# Patient Record
(demographics unavailable — no encounter records)

---

## 2017-01-20 NOTE — HPE
DATE OF ADMISSION:  01/20/2017

 

PRIMARY CARE PROVIDER:  The patient used to go to [facility].  However, his

primary care provider left and currently does not see a primary care doctor.

 

REASON FOR ADMISSION:  Shortness of breath.

 

HISTORY OF PRESENT ILLNESS:  The patient is a 50-year-old male with no known

medical history, presented to the emergency room complaining of chest pain since

around 5 o'clock last evening.  He stated the pain started in the right side of

his chest and radiated to the left side.  Denies any other symptoms other than

shortness of breath associated with the pain, states that it gets worse with deep

inspiration, described the pain as sharp in nature and constant, reproducible,

denies any associated diaphoresis, dizziness or nausea with it, states he has

never had pain like this before.  He presented to the emergency room because of a

family history of cardiac disease.  Prior to arrival to the emergency room the

patient was given nitroglycerin and aspirin in the ambulance.  In the emergency

room the patient was found to be slightly hypoxic and was started on oxygen.

Blood pressure was 120s over 87, pulse in the 80s, respiratory rate 18,

temperature 97.7.  First set of troponins were negative.  EKG showed no ST

changes.  D-dimer was ordered.  It was found to be elevated.  The patient had an

IV dye allergy, so VQ scan was ordered which showed low intermediate probability

of pulmonary embolus (PE).  The patient was admitted under hospitalist service.

 

REVIEW OF SYSTEMS:  12-point review of systems was obtained all of which was

negative except for those mentioned above.

 

PAST MEDICAL HISTORY:  None.

 

PAST SURGICAL HISTORY:  The patient has history of back surgery and hernia

repair.

 

HOME MEDICATION:  None.

 

ALLERGIES:  To PENICILLIN, reaction severe, and IV DYE, unknown reaction.

 

SOCIAL HISTORY:  The patient smokes one pack a day for the past 30 years.  Drinks

daily 6-10 beers.  Lives at home with his wife and stepson.

 

FAMILY HISTORY:  The patient's father had a heart attack at age 42 and he has a

cousin that had a heart attack at age 38.  Mother has history of seizures.

 

PHYSICAL EXAMINATION:

Blood pressure 126/87, pulse 80, respiratory rate 18, temperature 97.7, pulse

oximetry 92% on room air.

HEENT:  Pupils equal, round, reactive to light and accommodation.

NECK:  Supple.  No jugular venous distention (JVD).

LUNGS:  Diminished breath sounds in all lung fields.

CARDIAC: +3/6 systolic murmur.  Regular rate and rhythm.

ABDOMEN:  Soft, nontender, nondistended.

EXTREMITIES:  No clubbing, cyanosis or edema.

SKIN:  No new lesions or rashes.

NEUROLOGICAL:  Cranial nerves II-XII grossly intact.  No focal deficits.

 

LABORATORY DATA:

Sodium 138, potassium 3.6, chloride 101, BUN 3, creatinine 0.52, fasting glucose

72, calcium 7.9, troponin negative times two.  WBC 6.4, hemoglobin 12.7,

hematocrit 37.9, platelet count 56, D-dimer 2344.

 

Chest x-ray showed trace left basilar atelectasis.

 

Lung VQ scan showed low indeterminate probability of PE, extensive upper lobe

matched defect contributed by the air trapping.

 

Ultrasound showed no evidence of deep venous thrombosis (DVT) bilateral lower

extremities.

 

ASSESSMENT/PLAN:

1.  Chest pain, reproducible, likely musculoskeletal in nature.  However given

the patient's family history we will admit the patient for cardiac rule out.  We

will order troponins every 6 hours times three.  Repeat EKG in the morning.  We

will monitor the patient on telemetry.  We will order a CT of the chest without

contrast.

 

2.  Shortness of breath.  May be secondary to undiagnosed chronic obstructive

pulmonary disease (COPD).  The patient is a heavy smoker, has been smoking one

pack per day for the past 30 years, has diminished breath sounds in all lung

fields.  We will start him on oxygen, titrate to keep oxygen saturation between

88 and 92.

 

3.  Cardiac systolic murmur.  Per patient, he has no history of murmurs.  Will

order an echocardiogram.

 

4.  Thrombocytopenia, unknown etiology at this time.

 

5.  History of heavy alcohol use.  We will start the patient on Serax, thiamine,

folate, multivitamin.

 

6.  Deep venous thrombosis (DVT) prophylaxis.  Sequential compression devices

(SCDs) while in bed.

## 2017-01-20 NOTE — EDDOCDS
Nurse's Notes                                                                                     

[facility]                                                                         

Name: Gene Angel Ii                                                                               

Age: 50 yrs                                                                                       

Sex: Male                                                                                         

: 1966                                                                                   

MRN: [id number]                                                                                     

Arrival Date: 2017                                                                          

Time: 05:49                                                                                       

Account#: [id number]                                                                              

Bed Admit Hold                                                                                    

Private MD: Jaleesa Pcp                                                                                

Diagnosis: Chest pain, unspecified                                                                

                                                                                                  

Presentation:                                                                                     

                                                                                             

05:55 Presenting complaint: EMS states: patient complaining of chest pain since 1700          kas2

      yesterday. Right sided chest pain radiating into left side. States he cannot get a deep     

      breath. Denies nausea. Aspirin was taken PTA. Adult Sepsis Screening: The patient does      

      not have new or worsening altered mentation. Patient's respiratory rate is less than        

      22. Systolic blood pressure is greater than 100. Patient has a qSOFA score of 0-            

      Negative Sepsis Screen. Suicide/Homicide risk assessment- the patient denies having any     

      suicidal and/or homicidal ideations and does not present with any other emotional,          

      behavioral or mental health complaints.  Status: Patient is not a service           

      member or  dependent. Transition of care: patient was not received from another     

      setting of care.                                                                            

05:55 Acuity: OWEN Level 3                                                                     [facility]

05:55 Method Of Arrival: Ambulance                                                            [facility]

                                                                                                  

Triage Assessment:                                                                                

06:00 General: Appears in no apparent distress, uncomfortable, well nourished, Behavior is    kas2

      appropriate for age, cooperative. Pain: Location: anterior aspect of left upper chest       

      Pain currently is 8 out of 10 on a pain scale. Pain does not radiate. Quality of pain       

      is described as stabbing, Pain began 2-3 days ago Is continuous. Pt Declines HIV            

      testing. Neurological: Level of Consciousness is awake, alert, Oriented to person,          

      place, time. Cardiovascular: Capillary refill < 3 seconds Heart tones S1 S2 present         

      Chest pain is described as severe, quality is stabbing, is located in left chest wall       

      radiates Does not radiate. episodes are continuous began 3 days ago. Respiratory:           

      Airway is patent Respiratory effort is even, unlabored, Respiratory pattern is regular,     

      symmetrical. Derm: Skin is intact, Skin is dry, Skin is pink, warm & dry. Skin            

      temperature is warm.                                                                        

                                                                                                  

Historical:                                                                                       

- Allergies: Penicillins; IV Dye;                                                                 

- Home Meds:                                                                                      

1. none                                                                                         

- PMHx: back surgery;                                                                             

- Social history: Smoking status: Patient uses tobacco products, heavy tobacco smoker.            

No barriers to communication noted, The patient speaks fluent English.                          

- : The pt / caregiver states he / she is not on anticoagulants. Note patient not on              

meds.                                                                                           

- Exposure Risk Screening:: None identified.                                                      

                                                                                                  

                                                                                                  

Screenin:21 Screening information is obtained from the patient. Fall risk: No risks identified.     tm5 

      Assistance ADL's: requires no assistance with activities of daily living. Abuse/DV          

      Screen: The patient / caregiver reports he/she is: not in a situation that causes fear,     

      pain or injury. Nutritional screening: No deficits noted. Advance Directives:               

      Currently, there is no health care proxy. There is no active DNR order. home support is     

      adequate.                                                                                   

                                                                                                  

Assessment:                                                                                       

06:04 General: See triage note..                                                              kas2

06:21 Cardiovascular: Rhythm is sinus rhythm No ectopy.                                       tm5 

07:17 General: Appears in no apparent distress, Behavior is appropriate for age, cooperative. hs1 

      Pain: Location: left breast Pain currently is 6 out of 10 on a pain scale.                  

      Neurological: No deficits noted. Cardiovascular: Rhythm is sinus rhythm No ectopy.          

      Respiratory: Airway is patent Respiratory effort is even, unlabored, Respiratory            

      pattern is regular, symmetrical, Breath sounds are coarse bilaterally. GI: Denies           

      vomiting. Derm: Skin is pink, warm & dry. normal.                                         

08:31 General: Patient in radiology wing having radiographic studies at this time. .          hs1 

09:41 General: Appears in no apparent distress, comfortable, to be sleeping. Patient resting  hs1 

      with eyes closed. Patients lights dimmed and door closed to minimize noise for patient      

      comfort. .                                                                                  

10:38 General: Appears in no apparent distress, comfortable, Behavior is appropriate for age, hs1 

      cooperative. Pain: Denies pain. Neurological: No deficits noted. Cardiovascular: Rhythm     

      is sinus rhythm No ectopy. Respiratory: No deficits noted. GI: No deficits noted. Derm:     

      Skin is pink, warm & dry. normal.                                                         

11:32 General: Appears in no apparent distress, comfortable, Behavior is appropriate for age, hs1 

      cooperative. Pain: Denies pain. Cardiovascular: Rhythm is sinus rhythm No ectopy.           

      Respiratory: No deficits noted. Airway is patent Respiratory effort is even, unlabored,     

      Respiratory pattern is regular, symmetrical, Breath sounds are coarse bilaterally.          

      Derm: Skin is pink, warm & dry. normal.                                                   

12:24 General: Appears in no apparent distress, comfortable, Behavior is appropriate for age, hs1 

      cooperative. Pain: Location: chest Pain currently is 2 out of 10 on a pain scale.           

      Neurological: No deficits noted. Cardiovascular: Rhythm is sinus rhythm No ectopy.          

      Respiratory: No deficits noted. GI: No deficits noted. Derm: Skin is pink, warm & dry.    

      normal.                                                                                     

13:37 General: Patient having active chest pains 10/10 patient states tears and crushing      hs1 

      chest pain. Mary NELSON Called and she states orders placed in computer. Patient aware. .      

      Cardiovascular: Rhythm is sinus rhythm No ectopy. Respiratory: No deficits noted. GI:       

      No deficits noted.                                                                          

14:32 General: Appears in no apparent distress, comfortable, Behavior is appropriate for age, hs1 

      cooperative, Patient states mild heartburn at this time. Patient resting on stretcher       

      and transferred to hospital bed in care of alexis MADELYN Allred at this time. .         

                                                                                                  

Vital Signs:                                                                                      

06:13  / 85 (auto/);                                                                    tm5 

06:15 Pulse 84 MON; Pulse Ox 93% on R/A;                                                      tm5 

06:18  / 85; Pulse 82; Resp 18; Temp 97.7(O); Pulse Ox 92% on R/A; Weight 81.65 kg (R); jmv 

      Height 5 ft. 7 in. (170.18 cm); Pain 8/10;                                                  

06:36  / 87 (auto/);                                                                    tm5 

06:37 Pulse 80 MON; Pulse Ox 92% on R/A;                                                      tm5 

07:51  / 73 (auto/);                                                                    hs1 

07:52 Pulse 84 MON; Resp 18; Pulse Ox 94% ;                                                   hs1 

08:06  / 80 (auto/);                                                                    hs1 

08:06 Pulse 86 MON; Pulse Ox 95% ;                                                            hs1 

08:21  / 77 (auto/);                                                                    hs1 

08:21 Pulse 86 MON; Pulse Ox 93% ;                                                            hs1 

08:36  / 81 (auto/);                                                                    hs1 

08:36 Pulse 88 MON; Pulse Ox 84% ;                                                            hs1 

08:51  / 70 (auto/);                                                                    hs1 

08:51 Pulse 80 MON; Pulse Ox 92% ;                                                            hs1 

09:06  / 78 (auto/);                                                                    hs1 

09:06 Pulse 86 MON; Pulse Ox 91% ;                                                            hs1 

09:21  / 65 (auto/);                                                                    hs1 

09:21 Pulse 88 MON; Pulse Ox 91% ;                                                            hs1 

09:36  / 67 (auto/);                                                                    hs1 

09:36 Pulse 90 MON; Resp 18; Pulse Ox 90% ;                                                   hs1 

09:51  / 68 (auto/);                                                                    hs1 

09:51 Pulse 90 MON; Pulse Ox 90% ;                                                            hs1 

10:06  / 71 (auto/);                                                                    hs1 

10:06 Pulse 92 MON; Pulse Ox 90% ;                                                            hs1 

10:21  / 71 (auto/);                                                                    hs1 

10:22 Pulse 86 MON; Pulse Ox 92% ;                                                            hs1 

10:36 BP 96 / 66 (auto/);                                                                     hs1 

10:36 Pulse 84 MON; Pulse Ox 91% ;                                                            hs1 

10:41 Pulse 82 MON; Pulse Ox 90% ;                                                            hs1 

11:26  / 86 (auto/);                                                                    hs1 

11:41  / 81 (auto/);                                                                    hs1 

11:41 Pulse 82 MON; Pulse Ox 93% ;                                                            hs1 

11:56  / 84 (auto/);                                                                    hs1 

11:56 Pulse 76 MON; Resp 18; Pulse Ox 94% ;                                                   hs1 

12:09 Pulse 78 MON; Pulse Ox 93% ;                                                            hs1 

12:11  / 79 (auto/);                                                                    hs1 

12:25 Pulse 80 MON; Pulse Ox 93% ;                                                            hs1 

12:26  / 87 (auto/);                                                                    hs1 

12:41  / 87 (auto/);                                                                    hs1 

12:41 Pulse 84 MON; Pulse Ox 94% ;                                                            hs1 

12:56  / 86 (auto/);                                                                    hs1 

12:57 Pulse 116 MON; Pulse Ox 92% ;                                                           hs1 

13:10 Pulse 126 MON;                                                                          hs1 

13:11 Pulse 104 MON; Pulse Ox 91% ;                                                           hs1 

13:11  / 91 (auto/);                                                                    hs1 

13:12 Pulse 92 MON;                                                                           hs1 

13:28  / 81 (auto/);                                                                    hs1 

13:29 Pulse 100 MON; Resp 18; Pulse Ox 93% ; Pain 10/10;                                      hs1 

13:41  / 78 (auto/);                                                                    hs1 

13:42 Pulse 86 MON; Pulse Ox 90% ;                                                            hs1 

13:56  / 74 (auto/);                                                                    hs1 

13:57 Pulse 86 MON; Pulse Ox 89% ;                                                            hs1 

14:11  / 72 (auto/);                                                                    hs1 

14:12 Pulse 84 MON; Resp 18; Pulse Ox 91% ;                                                   hs1 

06:18 Body Mass Index 28.19 (81.65 kg, 170.18 cm)                                             [facility] 

                                                                                                  

Vitals:                                                                                           

06:00 Log In Time N/A - ambulance arrival.                                                    [facility]

                                                                                                  

ED Course:                                                                                        

05:50 Patient visited by Unique Roberts PCA.                                                tmm1

05:50 Patient moved to Waiting                                                                tmm1

05:51 No Pcp is Private Physician.                                                            tmm1

05:53 Eladia Rahman,RN is Primary Nurse.                                                 tmm1

05:53 Patient moved to 11                                                                     tmm1

05:57 Triage Initiated                                                                        kas2

06:04 Maintain field IV. Dressing intact. Site clean & dry. Gauge & site: 18G right forearm.  kas2

06:05 Patient visited by Ashley Jorgensen RN.                                                        [facility]

06:20 Pt greeted and oriented to ED. Patient advised of names of staff involved in care,      [facility] 

      location of call bell, wait times and NPO status. Accompanied by Significant Other,         

      Patient has correct armband on for positive identification. Placed in gown. Bed in low      

      position. Call light in reach. Side rails up X2. Cardiac monitor on. Pulse ox on. NIBP      

      on.                                                                                         

06:20 Basic Metabolic Profile Sent.                                                           tm5 

06:20 CBC with Diff Sent.                                                                     tm5 

06:20 Cardiac Injury Profile Sent.                                                            tm5 

06:20 D-Dimer Quant Sent.                                                                     tm5 

06:21 Patient visited by Ankur Snyder PCA.                                                     [facility] 

06:21 Troponin Sent.                                                                          tm5 

06:21 Labs drawn. (by ED staff). Sent per order to lab.                                       tm5 

06:21 EKG done. (by ED staff). Reviewed by Sree Bowden DO.                                v 

06:47 Patient visited by Milana Mcfadden RN.                                                     tm5 

06:48 Notified attending ED physician of Critical lab value. Dr Bowden aware of D-dimer      5 

      results.                                                                                    

07:07 Lorraine Snatos DO is PHCP.                                                           jo4 

07:07 Lobito Alba MD is Attending Physician.                                               jo4 

07:17 Patient visited by Lois Hilton RN.                                                hs1 

07:23 Patient visited by Lorraine Santos DO.                                                jo4 

07:32 Chest, 2 View (pa\E\lat) Returned.                                                        EDMS

08:05 Patient moved to Ultrasound                                                             eg2 

08:08 Patient visited by Lobito Alba MD.                                                   br1 

08:22 Patient name changed from Gene\S\L\S\Raymo\S\ to Gene\S\L\S\Raymo Ii.                          
   EDMS

08:23 [facility] Payment Agreement was scanned into [employer] and attached to record.               lg  

08:42 Patient moved to 11                                                                     eg2 

08:43 Patient visited by Lois Hilton RN.                                                hs1 

09:00 US Lower Extremities Bilateral R/O DVT Returned.                                        EDMS

09:39 Patient visited by Lois Hilton RN.                                                hs1 

10:37 Patient visited by Lois Hilton RN.                                                hs1 

10:38 The patient / caregiver is instructed regarding the plan of care and ED course.         hs1 

11:30 Patient visited by Lois Hilton RN.                                                hs1 

11:59 Patient visited by Filipe Seay PCA.                                               jrd 

11:59 EKG done. (by ED staff). Reviewed by Lorraine Santos DO.                                jrd 

12:08 CARDIAC MARKER PANEL Sent.                                                              hs1 

12:09 Patient visited by Lois Hilton RN.                                                hs1 

12:34 Jed Hernandez is Hospitalizing Provider.                                                  br1 

13:38 Patient visited by Filipe Seay PCA.                                               jrd 

13:38 EKG done. (by ED staff). Reviewed by Lorraine Santos DO.                                raiza 

13:45 VQ Scan Returned.                                                                       EDMS

13:48 Patient moved to Admit Hold                                                             dy  

14:31 No procedures done that require assistance.                                             hs1 

15:11 T-Sheet-- Draft Copy was scanned into [employer] and attached to record.                   gb  

                                                                                                  

Administered Medications:                                                                         

08:30 Drug: Aspirin 325 mg [aspirin 325 mg tablet (1 tabs)] Route: PO;                        hs1 

                                                                                                  

                                                                                                  

Order Results:                                                                                    

Lab Order: Basic Metabolic Profile; SPEC'M 17 06:20                                         

      Test: GLUCOSE, FASTING; Value: 92; Range: ; Units: MG/DL; Status: F                   

      Test: BLOOD UREA NITROGEN; Value: 3; Range: 7-18; Abnormal: Below low normal; Units:        

      MG/DL; Status: F                                                                            

      Test: CREATININE FOR GFR; Value: 0.52; Range: 0.70-1.30; Abnormal: Below low normal;        

      Units: MG/DL; Status: F                                                                     

      Test: GLOMERULAR FILTRATION RATE; Value: > 60.0; Range: >56; Status: F                      

      Test: SODIUM LEVEL; Value: 138; Range: 136-145; Units: MEQ/L; Status: F                     

      Test: POTASSIUM SERUM; Value: 3.6; Range: 3.5-5.1; Units: MEQ/L; Status: F                  

      Test: CHLORIDE LEVEL; Value: 101; Range: ; Units: MEQ/L; Status: F                    

      Test: CARBON DIOXIDE LEVEL; Value: 30; Range: 21-32; Units: MEQ/L; Status: F                

      Test: ANION GAP; Value: 7; Range: 8-16; Abnormal: Below low normal; Units: MEQ/L;           

      Status: F                                                                                   

      Test: CALCIUM LEVEL; Value: 7.9; Range: 8.5-10.1; Abnormal: Below low normal; Units:        

      MG/DL; Status: F                                                                            

      Test Note: &nbsp;; Units are mL/min/1.73 m2 Chronic Kidney Disease Staging per NKF:       

      Stage I & II GFR >=60 Normal to Mildly Decreased Stage III GFR 30-59 Moderately           

      Decreased Stage IV GFR 15-29 Severely Decreased Stage V GFR <15 Very Little GFR Left        

      ESRD GFR <15 on RRT                                                                         

Lab Order: CBC with Diff; SPEC'M 17 06:20                                                   

      Test: WHITE BLOOD COUNT; Value: 6.4; Range: 4.0-10.0; Units: K/mm3; Status: F               

      Test: RED BLOOD COUNT; Value: 3.54; Range: 4.30-6.10; Abnormal: Below low normal;           

      Units: M/mm3; Status: F                                                                     

      Test: HEMOGLOBIN; Value: 12.7; Range: 14.0-18.0; Abnormal: Below low normal; Units:         

      g/dl; Status: F                                                                             

      Test: HEMATOCRIT; Value: 37.9; Range: 42.0-52.0; Abnormal: Below low normal; Units: %;      

      Status: F                                                                                   

      Test: MEAN CORPUSCULAR VOLUME; Value: 107.2; Range: 80.0-96.0; Abnormal: Above high         

      normal; Units: fl; Status: F                                                                

      Test: MEAN CORPUSCULAR HEMOGLOBIN; Value: 36.0; Range: 27.0-33.0; Abnormal: Above high      

      normal; Units: pg; Status: F                                                                

      Test: MEAN CORPUSCULAR HGB CONC; Value: 33.6; Range: 32.0-36.5; Units: g/dl; Status: F      

      Test: RED CELL DISTRIBUTION WIDTH; Value: 14.4; Range: 11.5-14.5; Units: %; Status: F       

      Test: PLATELET COUNT, AUTOMATED; Value: 56; Range: 150-450; Abnormal: Below low normal;     

      Units: k/mm3; Status: F                                                                     

      Test: NEUTROPHILS %; Value: 53.6; Range: 36.0-66.0; Units: %; Status: F                     

      Test: LYMPH %; Value: 30.5; Range: 24.0-44.0; Units: %; Status: F                           

      Test: MONO %; Value: 9.2; Range: 0.0-5.0; Abnormal: Above high normal; Units: %;            

      Status: F                                                                                   

      Test: EOS %; Value: 2.6; Range: 0.0-3.0; Units: %; Status: F                                

      Test: BASO %; Value: 0.8; Range: 0.0-1.0; Units: %; Status: F                               

      Test: LARGE UNSTAINED CELL %; Value: 3.3; Range: 0.0-4.0; Units: %; Status: F               

      Test: NEUTROPHILS #; Value: 3.4; Range: 1.8-7.7; Units: K/mm3; Status: F                    

      Test: LYMPH #; Value: 1.9; Range: 1.5-4.5; Units: K/mm3; Status: F                          

      Test: MONO #; Value: 0.6; Range: 0.0-0.8; Units: K/mm3; Status: F                           

      Test: EOS #; Value: 0.2; Range: 0.0-0.50; Units: K/mm3; Status: F                           

      Test: BASO #; Value: 0.0; Range: 0.0-0.2; Units: K/mm3; Status: F                           

      Test: LARGE UNSTAINED CELL #; Value: 0.2; Range: 0.0-0.4; Units: K/mm3; Status: F           

Lab Order: Cardiac Injury Profile; SPEC'M 17 06:20                                          

      Test: CPK CREATINE PHOSPHOKINASE; Value: 134; Range: ; Units: U/L; Status: F          

      Test: CK-MB VALUE MASS; Value: 1.5; Range: 0.0-3.6; Units: NG/ML; Status: F                 

      Test: MB/CK RELATIVE INDEX; Value: 1.11; Range: < OR =4; Status: F                          

      Test Note: &nbsp;; DIAGNOSIS CRITERIA MMB ng/ml Relative Index (RI) NON-AMI < or = 5      

      N/A GRAY ZONE > 5 < or = 4 AMI > 5 > 4                                                      

Lab Order: D-Dimer Quant; SPEC17 06:20                                                   

      Test: D-DIMER QUANT; Value: 2344.1; Range: <500; Abnormal: Above high normal; Units:        

      ng/ml; Status: F                                                                            

Lab Order: Troponin; SPEC17 06:20                                                        

      Test: TROPONIN I; Value: < 0.02; Range: < 0.10; Units: NG/ML; Status: F                     

      Test Note: &nbsp;; Troponin I Reference Interval for [employer] [place] LOCI: 99th             

      Percentile= 0.00-0.045 ng/ml Risk Stratification: <= 0.10 ng/ml Decreased Risk for          

      Adverse Clinical Events. 0.10-1.50 ng/ml Increased Risk for Adverse Clinical Events.        

      Evaluation of additional criterion and/or repeat testing in 2-6 hours is suggested to       

      rule out myocardial damage. >= 1.50 ng/ml Indicative of Myocardial Injury.                  

Lab Order: RBC MORPH PROF NO CHARGE; SPEC17 06:20                                        

      Test: PLATELET ESTIMATE; Range: NORMAL; Status: I                                           

      Test: RBC MORPHOLOGY; Value: NORMAL; Status: F                                              

      Test: MACROCYTOSIS; Value: 2+; Status: F                                                    

      Test: PLATELET ESTIMATE; Value: DECREASED; Range: NORMAL; Status: F                         

Lab Order: CARDIAC MARKER PANEL; SPEC 17 12:05                                            

      Test: CPK CREATINE PHOSPHOKINASE; Value: 110; Range: ; Units: U/L; Status: F          

      Test: CK-MB VALUE MASS; Value: 1.0; Range: 0.0-3.6; Units: NG/ML; Status: F                 

      Test: MB/CK RELATIVE INDEX; Value: 0.90; Range: < OR =4; Status: F                          

      Test: TROPONIN I; Value: < 0.02; Range: < 0.10; Units: NG/ML; Status: F                     

      Test Note: &nbsp;; DIAGNOSIS CRITERIA MMB ng/ml Relative Index (RI) NON-AMI < or = 5      

      N/A GRAY ZONE > 5 < or = 4 AMI > 5 > 4                                                      

                                                                                                  

Radiology Order: Chest, 2 View (pa\E\lat)                                                         

      Test: Chest, 2 View (pa\E\lat)                                                              

      REASON FOR EXAMINATION: Chest Pain; Clinical: Chest pain.; ; Technique: PA and              

      lateral.; ; Findings:; Trace left basilar atelectasis suggested. No further                 

      consolidation, effusion, or; pneumothorax. Mediastinum and cardiac silhouette normal.       

      Skeletal structures; intact.; ; Impression:; Trace left basilar atelectasis.; ; ;           

      Signed by; Isaiah Le MD 2017 07:18 A;                                            

Radiology Order: VQ Scan                                                                          

      Test: VQ Scan                                                                               

      REASON FOR EXAMINATION: Shortness of Breath;Chest Pain; Ventilation perfusion lung scan     

      2017; ; Indication: Shortness of breath, chest pain; ; Comparison: PA and lateral     

      chest 2017; ; Technique: After inhalation of 1.0 mCi 99m technetium DTPA aerosol,     

      5.5 mCi IV; technetium-99m Tc MAA was injected intravenously.; ; Findings: Overall          

      there is more homogeneous nuclide uptake within the lung; perfusion images when             

      compared to the finding ventilatory images.; ; There are moderate matched defects           

      nearly involving the bilateral upper lobes.; There are no VQ mismatches. There is air       

      trapping with radionuclide within the; trachea and central bronchi.; ; Corresponding        

      chest radiograph also performed today demonstrated trace of left; basilar atelectasis       

      only.; ; Impression; 1. Probability of pulmonary embolus based on VQ scan is is low         

      indeterminate.; There are no VQ mismatches. There are extensive upper lobe matched          

      defects; contributed to by the air trapping, somewhat limiting the exam.; ; ; Signed        

      by; Ana Maria Oliveira MD 2017 12:51 P;                                                    

Radiology Order: US Lower Extremities Bilateral R/O DVT                                           

      Test: US Lower Extremities Bilateral R/O DVT                                                

      REASON FOR EXAMINATION: Deformity/Swelling; BILATERAL LOWER EXTREMITY DOPPLER VENOUS        

      ULTRASOUND:; ; Clinical history: Chest discomfort, lower extremity swelling. Evaluate       

      for DVT.; ; ; Comparison: None.; ; Technique: The deep venous system of the bilateral       

      lower extremities is; evaluated with gray scale imaging, compression ultrasound, color      

      imaging and; duplex Doppler interrogation. Examination from the groin through the           

      popliteal; fossa into the proximal calf.; ; Findings: There is full compressibility         

      from the common femoral vein in the; inguinal region through the popliteal vein on both     

      sides. Color imaging confirms; patency throughout the course of the deep venous system.     

      There is respiratory; variation and augmented flow at all levels. Incidentally noted is     

      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        

      above; the popliteal. This is an anatomic variation. In the left inguinal region is a;      

      1.8 x 1.4 x 0.5 cm lymph node consistent with a benign finding.; ; Impression:; 1. No       

      Doppler venous ultrasound evidence of DVT in the bilateral lower; extremities.; ; ;         

      Signed by; Rudy Lomas MD 2017 08:49 A;                                          

Outcome:                                                                                          

12:34 Decision to Hospitalize by Provider.                                                    br1 

14:32 Admission hand-off: Other: Diane Allred RN taking ED holders see documentation in     [address] for further assessments at this time. .                                            

17:13 Patient left the ED.                                                                    dy  

                                                                                                  

Signatures:                                                                                       

Dispatcher [employer]                           EDMS                                                 

Serenity Durand, Reg                  Reg  gb                                                   

Damion Jessica, Reg                    Reg  lg                                                   

Virgil Bolton, RN                       RN   dy                                                   

Catherine Wood                                   [id number]                                                  

Lobito Alba MD MD   br1                                                  

Lois Hilton RN                    RN   [id number]                                                  

Unique Roberts, PCA                    PCA  [id number]                                                 

Filipe Seay, PCA                   PCA  d                                                  

Lorraine Santos,                     DO   [id number]                                                  

Ashley Jorgensen,RN                            RN   [facility]                                                 

Ankur Snyder, PCA                         PCA  Milana Marrufo,RN                         RN   [id number]                                                  

                                                                                                  

**************************************************************************************************

MTDD

## 2017-01-20 NOTE — REP
Ventilation perfusion lung scan 01/20/2017

 

Indication:  Shortness of breath, chest pain

 

Comparison:  PA and lateral chest 01/20/2017

 

Technique:  After inhalation of 1.0 mCi 99m technetium DTPA aerosol, 5.5  mCi IV

technetium-99m Tc MAA was injected intravenously.

 

Findings:  Overall there is more homogeneous nuclide uptake within the lung

perfusion images when compared to the finding ventilatory images.

 

There are moderate matched defects nearly involving the bilateral upper lobes.

There are no VQ mismatches.  There is air trapping with radionuclide within the

trachea and  central bronchi.

 

Corresponding chest radiograph also performed today demonstrated trace of left

basilar atelectasis only.

 

Impression

1. Probability of  pulmonary embolus based on VQ scan is is low indeterminate.

There are no VQ mismatches.  There are extensive upper lobe matched defects

contributed to by the air trapping, somewhat limiting the exam.

 

 

Signed by

Ana Maria Oliveira MD 01/20/2017 12:51 P

## 2017-01-20 NOTE — REP
BILATERAL LOWER EXTREMITY DOPPLER VENOUS ULTRASOUND:

 

Clinical history:  Chest discomfort, lower extremity swelling.  Evaluate for DVT.

 

 

Comparison: None.

 

Technique:  The deep venous system of the bilateral lower extremities is

evaluated with gray scale imaging, compression ultrasound, color imaging and

duplex Doppler interrogation.  Examination from the groin through the popliteal

fossa into the proximal calf.

 

Findings: There is full compressibility from the common femoral vein in the

inguinal region through the popliteal vein on both sides.  Color imaging confirms

patency throughout the course of the deep venous system.  There is respiratory

variation and augmented flow at all levels. Incidentally noted is bilateral

partial duplication of the distal course of the femoral vein in the thigh above

the popliteal.  This is an anatomic variation.  In the left inguinal region is a

1.8 x 1.4 x 0.5 cm lymph node consistent with a benign finding.

 

Impression:

1.  No Doppler venous ultrasound evidence of DVT in the bilateral lower

extremities.

 

 

Signed by

Rudy Lomas MD 01/20/2017 08:49 A

## 2017-01-20 NOTE — REP
CT chest without contrast 01/20/2017

 

Indication:  Chest pain

 

Comparison:  Ventilation perfusion lung scan also performed 01/20/2017, PA and

lateral chest 01/20/2017

 

Technique:  3 mm contiguous spiral axial sections performed through chest without

contrast.

 

Findings:  The thoracic aorta is without aneurysm.  There are a few small

nonspecific mediastinal nodes.  There are no pathologically enlarged hilar nodes.

There are minimal coronary artery calcifications in the left anterior descending

and circumflex coronary arteries.  There is trace pericardial effusion of 2.7 mm

depth.

 

There are bullous changes seen bilaterally with upper lobe predominance .  Small

amount of dependent atelectasis/fibrotic scarring is noted laterally.  3.4 x 1.7

cm elliptical shaped pleural based opacity is seen in the medial right lung base,

image 76 series 202 and this  likely represents pleural scarring.

 

Small focal area of patchy interstitial prominence is seen in the left upper

lobe, image 32 series 201.  Small irregular density is seen within the posterior

segment of the right upper lobe on image 41 series 116, likely scarring.  Also

area of probable parenchymal scarring present within the posterior segment right

upper lobe on image 27 series 201.

 

Visualized portions of the liver without focal lesion.  Spleen is unremarkable.

Visualized portions of pancreas are normal.  There is small amount of right

pericholecystic fluid and/or mural thickening is seen in region of gallbladder

fundus .  The adrenal glands are normal.

 

Impression

Mild COPD with areas of probable scarring in the posterior segment right upper

lobe on image 27 series 201, right upper lobe on image 41 series 201,  and in the

left upper lobe (sees images  31-33, series 201).  3.4 x 1.7 cm elliptical shaped

pleural based opacity is seen in the medial right lung base, image 76 series 202

and this  likely represents pleural scarring.

 

Recommend interval follow-up CT of the chest and 3-6 months  to insure stable

findings.

 

Small amount of pericholecystic fluid and/or mural thickening, in region of

gallbladder fundus.  If clinically indicated may consider gallbladder ultrasound

 

 

 

 

Signed by

Ana Maria Oliveira MD 01/20/2017 05:06 P

## 2017-01-20 NOTE — EDDOCDS
Physician Documentation                                                                           

[facility]                                                                         

Name: Gene Angel Ii                                                                               

Age: 50 yrs                                                                                       

Sex: Male                                                                                         

: 1966                                                                                   

MRN: [id number]                                                                                     

Arrival Date: 2017                                                                          

Time: 05:49                                                                                       

Account#: [id number]                                                                              

Bed Admit Hold                                                                                    

Private MD: No Pcp                                                                                

Disposition:                                                                                      

                                                                                             

12:33 I have independently interviewed and examined the patient, and I agree with the         br1 

      investigation, diagnosis and treatment plan as documented by the Resident.                  

                                                                                                  

Disposition:                                                                                      

17 12:34 Hospitalization ordered by Jed Hernandez for Inpatient Admission. Preliminary        

diagnosis is Chest pain, unspecified.                                                           

- Bed requested for  PCU.                                                                        

- Status is Inpatient Admission.                                                              dy  

- Condition is Stable.                                                                            

- Problem is new.                                                                                 

- Symptoms are unchanged.                                                                         

                                                                                                  

                                                                                                  

                                                                                                  

Historical:                                                                                       

- Allergies: Penicillins; IV Dye;                                                                 

- Home Meds:                                                                                      

1. none                                                                                         

- PMHx: back surgery;                                                                             

- Social history: Smoking status: Patient uses tobacco products, heavy tobacco smoker.            

No barriers to communication noted, The patient speaks fluent English.                          

- : The pt / caregiver states he / she is not on anticoagulants. Note patient not on              

meds.                                                                                           

- Exposure Risk Screening:: None identified.                                                      

                                                                                                  

                                                                                                  

Vital Signs:                                                                                      

06:13  / 85 (auto/);                                                                    tm5 

06:15 Pulse 84 MON; Pulse Ox 93% on R/A;                                                      tm5 

06:18  / 85; Pulse 82; Resp 18; Temp 97.7(O); Pulse Ox 92% on R/A; Weight 81.65 kg /    jmv 

      180.01 lbs (R); Height 5 ft. 7 in. (170.18 cm); Pain 8/10;                                  

06:36  / 87 (auto/);                                                                    tm5 

06:37 Pulse 80 MON; Pulse Ox 92% on R/A;                                                      tm5 

07:51  / 73 (auto/);                                                                    hs1 

07:52 Pulse 84 MON; Resp 18; Pulse Ox 94% ;                                                   hs1 

08:06  / 80 (auto/);                                                                    hs1 

08:06 Pulse 86 MON; Pulse Ox 95% ;                                                            hs1 

08:21  / 77 (auto/);                                                                    hs1 

08:21 Pulse 86 MON; Pulse Ox 93% ;                                                            hs1 

08:36  / 81 (auto/);                                                                    hs1 

08:36 Pulse 88 MON; Pulse Ox 84% ;                                                            hs1 

08:51  / 70 (auto/);                                                                    hs1 

08:51 Pulse 80 MON; Pulse Ox 92% ;                                                            hs1 

09:06  / 78 (auto/);                                                                    hs1 

09:06 Pulse 86 MON; Pulse Ox 91% ;                                                            hs1 

09:21  / 65 (auto/);                                                                    hs1 

09:21 Pulse 88 MON; Pulse Ox 91% ;                                                            hs1 

09:36  / 67 (auto/);                                                                    hs1 

09:36 Pulse 90 MON; Resp 18; Pulse Ox 90% ;                                                   hs1 

09:51  / 68 (auto/);                                                                    hs1 

09:51 Pulse 90 MON; Pulse Ox 90% ;                                                            hs1 

10:06  / 71 (auto/);                                                                    hs1 

10:06 Pulse 92 MON; Pulse Ox 90% ;                                                            hs1 

10:21  / 71 (auto/);                                                                    hs1 

10:22 Pulse 86 MON; Pulse Ox 92% ;                                                            hs1 

10:36 BP 96 / 66 (auto/);                                                                     hs1 

10:36 Pulse 84 MON; Pulse Ox 91% ;                                                            hs1 

10:41 Pulse 82 MON; Pulse Ox 90% ;                                                            hs1 

11:26  / 86 (auto/);                                                                    hs1 

11:41  / 81 (auto/);                                                                    hs1 

11:41 Pulse 82 MON; Pulse Ox 93% ;                                                            hs1 

11:56  / 84 (auto/);                                                                    hs1 

11:56 Pulse 76 MON; Resp 18; Pulse Ox 94% ;                                                   hs1 

12:09 Pulse 78 MON; Pulse Ox 93% ;                                                            hs1 

12:11  / 79 (auto/);                                                                    hs1 

12:25 Pulse 80 MON; Pulse Ox 93% ;                                                            hs1 

12:26  / 87 (auto/);                                                                    hs1 

12:41  / 87 (auto/);                                                                    hs1 

12:41 Pulse 84 MON; Pulse Ox 94% ;                                                            hs1 

12:56  / 86 (auto/);                                                                    hs1 

12:57 Pulse 116 MON; Pulse Ox 92% ;                                                           hs1 

13:10 Pulse 126 MON;                                                                          hs1 

13:11 Pulse 104 MON; Pulse Ox 91% ;                                                           hs1 

13:11  / 91 (auto/);                                                                    hs1 

13:12 Pulse 92 MON;                                                                           hs1 

13:28  / 81 (auto/);                                                                    hs1 

13:29 Pulse 100 MON; Resp 18; Pulse Ox 93% ; Pain 10/10;                                      hs1 

13:41  / 78 (auto/);                                                                    hs1 

13:42 Pulse 86 MON; Pulse Ox 90% ;                                                            hs1 

13:56  / 74 (auto/);                                                                    hs1 

13:57 Pulse 86 MON; Pulse Ox 89% ;                                                            hs1 

14:11  / 72 (auto/);                                                                    hs1 

14:12 Pulse 84 MON; Resp 18; Pulse Ox 91% ;                                                   hs1 

06:18 Body Mass Index 28.19 (81.65 kg, 170.18 cm)                                             [facility] 

                                                                                                  

MDM:                                                                                              

05:59 ECG WITH READING ER PHYS+CARDIAG ordered.                                               EDMS

06:13 Cardiac Monitor/Pulse Ox/q 30 min VS ordered.                                           mm11

06:13 IV Saline Lock ordered.                                                                 mm11

06:13 Rhythm Strip to chart ordered.                                                          mm11

06:13 Undress patient appropriately for examination ordered.                                  mm11

06:14 Basic Metabolic Profile Ordered.                                                        EDMS

06:14 CBC with Diff Ordered.                                                                  EDMS

06:14 Cardiac Injury Profile Ordered.                                                         EDMS

06:14 D-Dimer Quant Ordered.                                                                  EDMS

06:14 Troponin Ordered.                                                                       EDMS

06:23 Chest, 2 View (pa\E\lat) Ordered.                                                         EDMS

07:07 RBC MORPH PROF NO CHARGE Ordered.                                                       EDMS

07:10 Basic Metabolic Profile Reviewed.                                                       jo4 

07:10 CBC with Diff Reviewed.                                                                 jo4 

07:10 D-Dimer Quant Reviewed.                                                                 jo4 

07:10 Cardiac Injury Profile Reviewed.                                                        jo4 

07:10 Troponin Reviewed.                                                                      jo4 

07:11 VQ Scan Ordered.                                                                        EDMS

07:58 Financial registration complete.                                                        lg  

08:01 Aspirin 325 mg PO once ordered.                                                         jo4 

08:01 US Lower Extremities Bilateral R/O DVT Ordered.                                         EDMS

08:03 Redraw CIP &Troponin (put time in details section) ordered.                             jo4

08:03 Repeat EKG (put time details section) ordered.                                          jo4 

08:08 Repeat EKG (put time details section) complete.                                         lbd 

08:08 Redraw CIP &Troponin (put time in details section) complete.                            lbd

08:11 ECG WITH READING ER PHYS ordered.                                                       EDMS

08:11 CARDIAC MARKER PANEL Ordered.                                                           EDMS

08:14 RBC MORPH PROF NO CHARGE Reviewed.                                                      jo4 

08:14 Chest, 2 View (pa\E\lat) Reviewed.                                                        jo4

08:14 CBC with Diff Reviewed.                                                                 jo4 

08:23 [facility] Payment Agreement was scanned into [employer] and attached to record.               lg  

09:04 US Lower Extremities Bilateral R/O DVT Reviewed.                                        jo4 

12:36 BED REQUEST+ADM ordered.                                                                EDMS

13:32 ECG WITH READING ER PHYS+CARDIAG ordered.                                               EDMS

13:36 Admission / Observation Status ordered.                                                 EDMS

13:36 ECHOCARD,DOPPLER/COLOR FLOW ordered.                                                    EDMS

13:36 ELECTROCARDIOGRAM ADULT ordered.                                                        EDMS

13:37 NO ADDED SALT DIET ordered.                                                             EDMS

13:38 TROPONIN Ordered.                                                                       EDMS

14:43 CT Chest without contrast Ordered.                                                      EDMS

15:11 T-Sheet-- Draft Copy was scanned into [employer] and attached to record.                   gb  

                                                                                                  

Administered Medications:                                                                         

08:30 Drug: Aspirin 325 mg [aspirin 325 mg tablet (1 tabs)] Route: PO;                        hs1 

                                                                                                  

                                                                                                  

Signatures:                                                                                       

Dispatcher [employer]                           EDMS                                                 

Shelby Lim, Unit Clerk                 Unit lbd                                                  

Serenity Durand, Reg                  Reg  gb                                                   

Damion Jessica, Reg                    Reg  lg                                                   

Virgil Bolton, RN                       Sree Puga,                     DO   mm11                                                 

Lobito Alba MD MD   br1                                                  

Lorraine Santos DO                    DO   jo4                                                  

Ashlye Jorgensen RN RN   kas2                                                 

Suraj Driscoll RN RN sa Sherrill, Hannah RN   hs1                                                  

                                                                                                  

The chart was reviewed and I authenticate all verbal orders and agree with the evaluation and 
treatment provided.Attachments:

08:23 [facility] Payment Agreement                                                                lg  

15:11 T-Sheet-- Draft Copy                                                                    gb  

                                                                                                  

**************************************************************************************************

MTDD

## 2017-01-20 NOTE — REP
Clinical:  Chest pain.

 

Technique:  PA and lateral.

 

Findings:

Trace left basilar atelectasis suggested.  No further consolidation, effusion, or

pneumothorax.  Mediastinum and cardiac silhouette normal.  Skeletal structures

intact.

 

Impression:

Trace left basilar atelectasis.

 

 

Signed by

Isaiah Le MD 01/20/2017 07:18 A

## 2017-01-21 NOTE — ECGEPIP
Stationary ECG Study

                              [facility]

                                       

                                       Test Date:    2017

Pat Name:     KIRILL BULLOCK II            Department:   

Patient ID:   [id number]                 Room:         [place] [address]

Gender:       M                        Technician:   JERONIMO

:          1966               Requested By: LAINE VARELA

Order Number: [id number]     Reading MD:   Moses Tucker

                                 Measurements

Intervals                              Axis          

Rate:         75                       P:            59

NY:           161                      QRS:          73

QRSD:         84                       T:            49

QT:           396                                    

QTc:          443                                    

                           Interpretive Statements

Normal sinus rhythm

Delayed anterior R wave progression

Compared to prior tracing of 2017 at 18:48, atrial fibrillation has

resolved 

and there has been loss of anterior forces

 

Electronically Signed On 2017 9:13:21 EST by Moses Tucker

## 2017-01-21 NOTE — ECGEPIP
Stationary ECG Study

                              [facility]

                                       

                                       Test Date:    2017

Pat Name:     KIRILL BULLOCK II            Department:   

Patient ID:   [id number]                 Room:         [place] [address]

Gender:       M                        Technician:   

:          1966               Requested By: TAISHA JAIN 

Order Number: [id number]     Reading MD:   Moses Tucker

                                 Measurements

Intervals                              Axis          

Rate:         169                      P:            

DC:           0                        QRS:          83

QRSD:         80                       T:            38

QT:           270                                    

QTc:          453                                    

                           Interpretive Statements

Atrial fibrillation with rapid ventricular response

Nonspecific T wave abnormality

Compared to prior tracing of 2017, atrial fibrillation is new

Electronically Signed On 2017 9:07:58 EST by Moses Tucker

## 2017-01-21 NOTE — ECGEPIP
Stationary ECG Study

                           [facility]

                                       

                                       Test Date:    2017

Pat Name:     KIRILL BULLOCK II            Department:   

Patient ID:   [id number]                 Room:         -

Gender:       M                        Technician:   raiza

:          1966               Requested By: HIRAL DE LA FUENTE

Order Number: [id number]     Reading MD:   Shannan Patel

                                 Measurements

Intervals                              Axis          

Rate:         93                       P:            67

UT:           166                      QRS:          79

QRSD:         85                       T:            62

QT:           361                                    

QTc:          451                                    

                           Interpretive Statements

SINUS RHYTHM WITH OCCASIONAL SUPRAVENTRICULAR PREMATURE COMPLEXES

INCREASED RATE 17 11:57

Electronically Signed On 2017 8:07:29 EST by Shannan Patel

## 2017-01-21 NOTE — ECGEPIP
Stationary ECG Study

                           [facility]

                                       

                                       Test Date:    2017

Pat Name:     KIRILL BULLOCK               Department:   

Patient ID:   [id number]                 Room:         -

Gender:       M                        Technician:   kain

:          1966               Requested By: HUSEYIN MOJICA

Order Number: [id number]     Reading MD:   Shannan Patel

                                 Measurements

Intervals                              Axis          

Rate:         80                       P:            55

AR:           164                      QRS:          68

QRSD:         88                       T:            47

QT:           379                                    

QTc:          437                                    

                           Interpretive Statements

SINUS RHYTHM

NO PRIOR FOR COMPARISON

Electronically Signed On 2017 8:04:00 EST by Shannan Patel

## 2017-01-21 NOTE — ECGEPIP
Stationary ECG Study

                           [facility] - ED

                                       

                                       Test Date:    2017

Pat Name:     KIRILL BULLOCK II            Department:   

Patient ID:   [id number]                 Room:         -

Gender:       M                        Technician:   raiza

:          1966               Requested By: HIRAL DE LA FUENTE

Order Number: [id number]     Reading MD:   Shannan Patel

                                 Measurements

Intervals                              Axis          

Rate:         81                       P:            61

DC:           175                      QRS:          75

QRSD:         85                       T:            59

QT:           393                                    

QTc:          457                                    

                           Interpretive Statements

SINUS RHYTHM

SIMILAR 17 6:13

Electronically Signed On 2017 8:06:30 EST by Shannan Patel

## 2017-01-22 NOTE — DSES
DATE OF ADMISSION: 01/20/2017

DATE OF DISCHARGE: 01/21/2017

 

Patient left against medical advice.

 

PRIMARY CARE PROVIDER: None but we will attempt to call the patient on Monday to

setup a primary care provider.

 

FINAL DIAGNOSES:

1. Chest pain.

2. Shortness of breath.

3. Cardiac systolic murmur.

4. Thrombocytopenia.

5. Heavy alcohol use.

6. Atrial fibrillation with rapid ventricular response.

 

HISTORY OF PRESENT ILLNESS: This is a 50-year-old male patient with no known

medical history, heavy alcohol use, presented to the emergency room complaining

of chest pain since around 5:00 on the evening of admission. He stated that the

pain started in the right side of his chest, radiating to his left side. He

denies any other symptoms. Denies shortness of breath associated with the pain.

He stated that it gets worse with deep inspiration. He described the pain as

sharp in nature, constant, reproducible. Denies any associated diaphoresis,

dizziness, or nausea. He states that he has never had pain like this before. He

presented to the emergency room because of a family history of cardiac disease.

Prior to arrival to the emergency room, the patient had been given nitroglycerin

and aspirin in the ambulance. In the emergency room, the patient was found to

have slight hypoxia and was started on oxygen. Blood pressure in the 120s over

87, pulse of 80s, respiratory rate 18, temperature 97.7. First set of troponins

were negative. The patient had a ventilation/perfusion (V/Q) scan which was

inconclusive to low probably for pulmonary embolism (PE) but was inconclusive.

Subsequently, the patient was admitted.

 

HOSPITAL COURSE: The patient's chest pain was reproducible on physical

examination. Cardiac enzymes have been negative times four. The patient was

monitored under telemetry. Physical therapy, withdraw precautions, placed on

Serax, thiamine, multivitamin, folic acid was given. Overnight, the patient

became tachycardic. EKG shows atrial fibrillation. He was given digoxin and

placed on metoprolol. The patient reverted back to sinus. He was placed on

Lovenox for treatment. The patient's chest pain also resolved but the patient has

continued to be very unsteady and anxious. This morning, during rounds, the

patient stated that he wanted to leave against medical advice. Physicians have

discussed with the patient the risks of falling, strokes, heart attack, pulmonary

embolism, alcohol withdrawal, seizure, and death with the patient, but the

patient says that he has obligations at home. He cannot stay here and is

concerned about not being able to pay for hospital bill. Physician has discussed

that by Monday we can ask  to work on financial plans for the

patient. The patient has refused and physician further discussed the probability

of safety given the patient is still very unsteady. The patient refused further

assistance and just wanted a prescription for a cane which was provided.  In

addition, the risk of stroke has been discussed with the patient and the need for

anticoagulation. The patient has atrial fibrillation. Different options have been

discussed, but given the patient is very poorly compliant and would not want

frequent blood draws, the option of Eliquis was described to the patient. Given

the financial situation, Eliquis free 30-day coupon has been provided to the

patient and the patient is instructed to followup with primary care provider for

further recommendation and followup regarding anticoagulation. Hospitalist

 has been called for followup primary care appointment.

Subsequently, the patient left against medical advice.

 

VITAL SIGNS: Temperature 97.2, pulse 70, respirations 18, blood pressure 130/88,

pulse oximetry 97% on room air.

 

LABORATORY DATA: WBC 7.3, hemoglobin and hematocrit 14.4/44.3, platelets 64.

 

Chemistry: Sodium 135, potassium 4.4, chloride 100, bicarbonate 27, BUN 6,

creatinine 0.6.

 

Ultrasound Doppler negative for DVT.

 

DISCHARGE MEDICATIONS:

- Eliquis 5 mg by mouth twice a day

- folic acid 1 mg by mouth daily

- metoprolol 25 mg by mouth twice a day

- multivitamin one tablet by mouth daily

- thiamine 100 mg by mouth daily

 

DISCHARGE INSTRUCTIONS: The patient is instructed to followup with primary care

provider as soon as possible, preferably within the next seven days, and return

to the hospital if any symptoms worsen. The patient is encouraged to stay at the

hospital to complete the workup and for further care, but the patient refused and

left against medical advice.

## 2017-01-22 NOTE — EDDOCDS
Physician Documentation                                                                           

[facility]                                                                         

Name: Gene Angel Ii                                                                               

Age: 50 yrs                                                                                       

Sex: Male                                                                                         

: 1966                                                                                   

MRN: [id number]                                                                                     

Arrival Date: 2017                                                                          

Time: 05:49                                                                                       

Account#: [id number]                                                                              

Bed Admit Hold                                                                                    

Private MD: No Pcp                                                                                

Disposition:                                                                                      

                                                                                             

12:33 I have independently interviewed and examined the patient, and I agree with the         br1 

      investigation, diagnosis and treatment plan as documented by the Resident.                  

                                                                                                  

Disposition:                                                                                      

17 12:34 Hospitalization ordered by Jed Hernandez for Inpatient Admission. Preliminary        

diagnosis is Chest pain, unspecified.                                                           

- Bed requested for  PCU.                                                                        

- Status is Inpatient Admission.                                                              dy  

- Condition is Stable.                                                                            

- Problem is new.                                                                                 

- Symptoms are unchanged.                                                                         

                                                                                                  

                                                                                                  

                                                                                                  

Historical:                                                                                       

- Allergies: Penicillins; IV Dye;                                                                 

- Home Meds:                                                                                      

1. none                                                                                         

- PMHx: back surgery;                                                                             

- Social history: Smoking status: Patient uses tobacco products, heavy tobacco smoker.            

No barriers to communication noted, The patient speaks fluent English.                          

- : The pt / caregiver states he / she is not on anticoagulants. Note patient not on              

meds.                                                                                           

- Exposure Risk Screening:: None identified.                                                      

                                                                                                  

                                                                                                  

Vital Signs:                                                                                      

06:13  / 85 (auto/);                                                                    tm5 

06:15 Pulse 84 MON; Pulse Ox 93% on R/A;                                                      tm5 

06:18  / 85; Pulse 82; Resp 18; Temp 97.7(O); Pulse Ox 92% on R/A; Weight 81.65 kg /    jmv 

      180.01 lbs (R); Height 5 ft. 7 in. (170.18 cm); Pain 8/10;                                  

06:36  / 87 (auto/);                                                                    tm5 

06:37 Pulse 80 MON; Pulse Ox 92% on R/A;                                                      tm5 

07:51  / 73 (auto/);                                                                    hs1 

07:52 Pulse 84 MON; Resp 18; Pulse Ox 94% ;                                                   hs1 

08:06  / 80 (auto/);                                                                    hs1 

08:06 Pulse 86 MON; Pulse Ox 95% ;                                                            hs1 

08:21  / 77 (auto/);                                                                    hs1 

08:21 Pulse 86 MON; Pulse Ox 93% ;                                                            hs1 

08:36  / 81 (auto/);                                                                    hs1 

08:36 Pulse 88 MON; Pulse Ox 84% ;                                                            hs1 

08:51  / 70 (auto/);                                                                    hs1 

08:51 Pulse 80 MON; Pulse Ox 92% ;                                                            hs1 

09:06  / 78 (auto/);                                                                    hs1 

09:06 Pulse 86 MON; Pulse Ox 91% ;                                                            hs1 

09:21  / 65 (auto/);                                                                    hs1 

09:21 Pulse 88 MON; Pulse Ox 91% ;                                                            hs1 

09:36  / 67 (auto/);                                                                    hs1 

09:36 Pulse 90 MON; Resp 18; Pulse Ox 90% ;                                                   hs1 

09:51  / 68 (auto/);                                                                    hs1 

09:51 Pulse 90 MON; Pulse Ox 90% ;                                                            hs1 

10:06  / 71 (auto/);                                                                    hs1 

10:06 Pulse 92 MON; Pulse Ox 90% ;                                                            hs1 

10:21  / 71 (auto/);                                                                    hs1 

10:22 Pulse 86 MON; Pulse Ox 92% ;                                                            hs1 

10:36 BP 96 / 66 (auto/);                                                                     hs1 

10:36 Pulse 84 MON; Pulse Ox 91% ;                                                            hs1 

10:41 Pulse 82 MON; Pulse Ox 90% ;                                                            hs1 

11:26  / 86 (auto/);                                                                    hs1 

11:41  / 81 (auto/);                                                                    hs1 

11:41 Pulse 82 MON; Pulse Ox 93% ;                                                            hs1 

11:56  / 84 (auto/);                                                                    hs1 

11:56 Pulse 76 MON; Resp 18; Pulse Ox 94% ;                                                   hs1 

12:09 Pulse 78 MON; Pulse Ox 93% ;                                                            hs1 

12:11  / 79 (auto/);                                                                    hs1 

12:25 Pulse 80 MON; Pulse Ox 93% ;                                                            hs1 

12:26  / 87 (auto/);                                                                    hs1 

12:41  / 87 (auto/);                                                                    hs1 

12:41 Pulse 84 MON; Pulse Ox 94% ;                                                            hs1 

12:56  / 86 (auto/);                                                                    hs1 

12:57 Pulse 116 MON; Pulse Ox 92% ;                                                           hs1 

13:10 Pulse 126 MON;                                                                          hs1 

13:11 Pulse 104 MON; Pulse Ox 91% ;                                                           hs1 

13:11  / 91 (auto/);                                                                    hs1 

13:12 Pulse 92 MON;                                                                           hs1 

13:28  / 81 (auto/);                                                                    hs1 

13:29 Pulse 100 MON; Resp 18; Pulse Ox 93% ; Pain 10/10;                                      hs1 

13:41  / 78 (auto/);                                                                    hs1 

13:42 Pulse 86 MON; Pulse Ox 90% ;                                                            hs1 

13:56  / 74 (auto/);                                                                    hs1 

13:57 Pulse 86 MON; Pulse Ox 89% ;                                                            hs1 

14:11  / 72 (auto/);                                                                    hs1 

14:12 Pulse 84 MON; Resp 18; Pulse Ox 91% ;                                                   hs1 

06:18 Body Mass Index 28.19 (81.65 kg, 170.18 cm)                                             [facility] 

                                                                                                  

MDM:                                                                                              

05:59 ECG WITH READING ER PHYS+CARDIAG ordered.                                               EDMS

06:13 Cardiac Monitor/Pulse Ox/q 30 min VS ordered.                                           mm11

06:13 IV Saline Lock ordered.                                                                 mm11

06:13 Rhythm Strip to chart ordered.                                                          mm11

06:13 Undress patient appropriately for examination ordered.                                  mm11

06:14 Basic Metabolic Profile Ordered.                                                        EDMS

06:14 CBC with Diff Ordered.                                                                  EDMS

06:14 Cardiac Injury Profile Ordered.                                                         EDMS

06:14 D-Dimer Quant Ordered.                                                                  EDMS

06:14 Troponin Ordered.                                                                       EDMS

06:23 Chest, 2 View (pa\E\lat) Ordered.                                                         EDMS

07:07 RBC MORPH PROF NO CHARGE Ordered.                                                       EDMS

07:10 Basic Metabolic Profile Reviewed.                                                       jo4 

07:10 CBC with Diff Reviewed.                                                                 jo4 

07:10 D-Dimer Quant Reviewed.                                                                 jo4 

07:10 Cardiac Injury Profile Reviewed.                                                        jo4 

07:10 Troponin Reviewed.                                                                      jo4 

07:11 VQ Scan Ordered.                                                                        EDMS

07:58 Financial registration complete.                                                        lg  

08:01 Aspirin 325 mg PO once ordered.                                                         jo4 

08:01 US Lower Extremities Bilateral R/O DVT Ordered.                                         EDMS

08:03 Redraw CIP &Troponin (put time in details section) ordered.                             jo4

08:03 Repeat EKG (put time details section) ordered.                                          jo4 

08:08 Repeat EKG (put time details section) complete.                                         lbd 

08:08 Redraw CIP &Troponin (put time in details section) complete.                            lbd

08:11 ECG WITH READING ER PHYS ordered.                                                       EDMS

08:11 CARDIAC MARKER PANEL Ordered.                                                           EDMS

08:14 RBC MORPH PROF NO CHARGE Reviewed.                                                      jo4 

08:14 Chest, 2 View (pa\E\lat) Reviewed.                                                        jo4

08:14 CBC with Diff Reviewed.                                                                 jo4 

08:23 [facility] Payment Agreement was scanned into [employer] and attached to record.               lg  

09:04 US Lower Extremities Bilateral R/O DVT Reviewed.                                        jo4 

12:36 BED REQUEST+ADM ordered.                                                                EDMS

13:32 ECG WITH READING ER PHYS+CARDIAG ordered.                                               EDMS

13:36 Admission / Observation Status ordered.                                                 EDMS

13:36 ECHOCARD,DOPPLER/COLOR FLOW ordered.                                                    EDMS

13:36 ELECTROCARDIOGRAM ADULT ordered.                                                        EDMS

13:37 NO ADDED SALT DIET ordered.                                                             EDMS

13:38 TROPONIN Ordered.                                                                       EDMS

14:43 CT Chest without contrast Ordered.                                                      EDMS

15:11 T-Sheet-- Draft Copy was scanned into [employer] and attached to record.                   gb  

                                                                                             

12:12 ECG/EKG was scanned into [employer] and attached to record.                                gb  

12:12 Trend VS was scanned into [employer] and attached to record.                               gb  

12:13 PCR was scanned into [employer] and attached to record.                                    gb  

                                                                                                  

Administered Medications:                                                                         

                                                                                             

08:30 Drug: Aspirin 325 mg [aspirin 325 mg tablet (1 tabs)] Route: PO;                        hs1 

                                                                                                  

                                                                                                  

Signatures:                                                                                       

Dispatcher [employer]                           EDMS                                                 

Shelby Lim, Unit Clerk                 Unit lbd                                                  

Serenity Durand, Reg                  Reg  gb                                                   

Damion Jessica, Reg                    Reg  lg                                                   

Virgil Bolton, RN                       Sree Puga,                     DO   mm11                                                 

Lobito Alba MD MD   br1                                                  

Lorraine Santos DO                    DO   jo4                                                  

Ashley JorgensenRN                            RN   kas2                                                 

Suraj Driscoll, Lois Delarosa RN, sa, RN   hs1                                                  

                                                                                                  

The chart was reviewed and I authenticate all verbal orders and agree with the evaluation and 
treatment provided.Attachments:

08:23 [facility] Payment Agreement                                                                lg  

15:11 T-Sheet-- Draft Copy                                                                    gb  

                                                                                             

12:12 ECG/EKG                                                                                 gb  

                                                                                                  

**************************************************************************************************



*** Chart Complete ***
MTDD

## 2017-01-22 NOTE — EDDOCDS
Nurse's Notes                                                                                     

[facility]                                                                         

Name: Gene Angel Ii                                                                               

Age: 50 yrs                                                                                       

Sex: Male                                                                                         

: 1966                                                                                   

MRN: [id number]                                                                                     

Arrival Date: 2017                                                                          

Time: 05:49                                                                                       

Account#: [id number]                                                                              

Bed Admit Hold                                                                                    

Private MD: Jaleesa Pcp                                                                                

Diagnosis: Chest pain, unspecified                                                                

                                                                                                  

Presentation:                                                                                     

                                                                                             

05:55 Presenting complaint: EMS states: patient complaining of chest pain since 1700          kas2

      yesterday. Right sided chest pain radiating into left side. States he cannot get a deep     

      breath. Denies nausea. Aspirin was taken PTA. Adult Sepsis Screening: The patient does      

      not have new or worsening altered mentation. Patient's respiratory rate is less than        

      22. Systolic blood pressure is greater than 100. Patient has a qSOFA score of 0-            

      Negative Sepsis Screen. Suicide/Homicide risk assessment- the patient denies having any     

      suicidal and/or homicidal ideations and does not present with any other emotional,          

      behavioral or mental health complaints.  Status: Patient is not a service           

      member or  dependent. Transition of care: patient was not received from another     

      setting of care.                                                                            

05:55 Acuity: OWEN Level 3                                                                     [facility]

05:55 Method Of Arrival: Ambulance                                                            [facility]

                                                                                                  

Triage Assessment:                                                                                

06:00 General: Appears in no apparent distress, uncomfortable, well nourished, Behavior is    kas2

      appropriate for age, cooperative. Pain: Location: anterior aspect of left upper chest       

      Pain currently is 8 out of 10 on a pain scale. Pain does not radiate. Quality of pain       

      is described as stabbing, Pain began 2-3 days ago Is continuous. Pt Declines HIV            

      testing. Neurological: Level of Consciousness is awake, alert, Oriented to person,          

      place, time. Cardiovascular: Capillary refill < 3 seconds Heart tones S1 S2 present         

      Chest pain is described as severe, quality is stabbing, is located in left chest wall       

      radiates Does not radiate. episodes are continuous began 3 days ago. Respiratory:           

      Airway is patent Respiratory effort is even, unlabored, Respiratory pattern is regular,     

      symmetrical. Derm: Skin is intact, Skin is dry, Skin is pink, warm & dry. Skin            

      temperature is warm.                                                                        

                                                                                                  

Historical:                                                                                       

- Allergies: Penicillins; IV Dye;                                                                 

- Home Meds:                                                                                      

1. none                                                                                         

- PMHx: back surgery;                                                                             

- Social history: Smoking status: Patient uses tobacco products, heavy tobacco smoker.            

No barriers to communication noted, The patient speaks fluent English.                          

- : The pt / caregiver states he / she is not on anticoagulants. Note patient not on              

meds.                                                                                           

- Exposure Risk Screening:: None identified.                                                      

                                                                                                  

                                                                                                  

Screenin:21 Screening information is obtained from the patient. Fall risk: No risks identified.     tm5 

      Assistance ADL's: requires no assistance with activities of daily living. Abuse/DV          

      Screen: The patient / caregiver reports he/she is: not in a situation that causes fear,     

      pain or injury. Nutritional screening: No deficits noted. Advance Directives:               

      Currently, there is no health care proxy. There is no active DNR order. home support is     

      adequate.                                                                                   

                                                                                                  

Assessment:                                                                                       

06:04 General: See triage note..                                                              kas2

06:21 Cardiovascular: Rhythm is sinus rhythm No ectopy.                                       tm5 

07:17 General: Appears in no apparent distress, Behavior is appropriate for age, cooperative. hs1 

      Pain: Location: left breast Pain currently is 6 out of 10 on a pain scale.                  

      Neurological: No deficits noted. Cardiovascular: Rhythm is sinus rhythm No ectopy.          

      Respiratory: Airway is patent Respiratory effort is even, unlabored, Respiratory            

      pattern is regular, symmetrical, Breath sounds are coarse bilaterally. GI: Denies           

      vomiting. Derm: Skin is pink, warm & dry. normal.                                         

08:31 General: Patient in radiology wing having radiographic studies at this time. .          hs1 

09:41 General: Appears in no apparent distress, comfortable, to be sleeping. Patient resting  hs1 

      with eyes closed. Patients lights dimmed and door closed to minimize noise for patient      

      comfort. .                                                                                  

10:38 General: Appears in no apparent distress, comfortable, Behavior is appropriate for age, hs1 

      cooperative. Pain: Denies pain. Neurological: No deficits noted. Cardiovascular: Rhythm     

      is sinus rhythm No ectopy. Respiratory: No deficits noted. GI: No deficits noted. Derm:     

      Skin is pink, warm & dry. normal.                                                         

11:32 General: Appears in no apparent distress, comfortable, Behavior is appropriate for age, hs1 

      cooperative. Pain: Denies pain. Cardiovascular: Rhythm is sinus rhythm No ectopy.           

      Respiratory: No deficits noted. Airway is patent Respiratory effort is even, unlabored,     

      Respiratory pattern is regular, symmetrical, Breath sounds are coarse bilaterally.          

      Derm: Skin is pink, warm & dry. normal.                                                   

12:24 General: Appears in no apparent distress, comfortable, Behavior is appropriate for age, hs1 

      cooperative. Pain: Location: chest Pain currently is 2 out of 10 on a pain scale.           

      Neurological: No deficits noted. Cardiovascular: Rhythm is sinus rhythm No ectopy.          

      Respiratory: No deficits noted. GI: No deficits noted. Derm: Skin is pink, warm & dry.    

      normal.                                                                                     

13:37 General: Patient having active chest pains 10/10 patient states tears and crushing      hs1 

      chest pain. Mary NELSON Called and she states orders placed in computer. Patient aware. .      

      Cardiovascular: Rhythm is sinus rhythm No ectopy. Respiratory: No deficits noted. GI:       

      No deficits noted.                                                                          

14:32 General: Appears in no apparent distress, comfortable, Behavior is appropriate for age, hs1 

      cooperative, Patient states mild heartburn at this time. Patient resting on stretcher       

      and transferred to hospital bed in care of alexis MADELYN Allred at this time. .         

                                                                                                  

Vital Signs:                                                                                      

06:13  / 85 (auto/);                                                                    tm5 

06:15 Pulse 84 MON; Pulse Ox 93% on R/A;                                                      tm5 

06:18  / 85; Pulse 82; Resp 18; Temp 97.7(O); Pulse Ox 92% on R/A; Weight 81.65 kg (R); jmv 

      Height 5 ft. 7 in. (170.18 cm); Pain 8/10;                                                  

06:36  / 87 (auto/);                                                                    tm5 

06:37 Pulse 80 MON; Pulse Ox 92% on R/A;                                                      tm5 

07:51  / 73 (auto/);                                                                    hs1 

07:52 Pulse 84 MON; Resp 18; Pulse Ox 94% ;                                                   hs1 

08:06  / 80 (auto/);                                                                    hs1 

08:06 Pulse 86 MON; Pulse Ox 95% ;                                                            hs1 

08:21  / 77 (auto/);                                                                    hs1 

08:21 Pulse 86 MON; Pulse Ox 93% ;                                                            hs1 

08:36  / 81 (auto/);                                                                    hs1 

08:36 Pulse 88 MON; Pulse Ox 84% ;                                                            hs1 

08:51  / 70 (auto/);                                                                    hs1 

08:51 Pulse 80 MON; Pulse Ox 92% ;                                                            hs1 

09:06  / 78 (auto/);                                                                    hs1 

09:06 Pulse 86 MON; Pulse Ox 91% ;                                                            hs1 

09:21  / 65 (auto/);                                                                    hs1 

09:21 Pulse 88 MON; Pulse Ox 91% ;                                                            hs1 

09:36  / 67 (auto/);                                                                    hs1 

09:36 Pulse 90 MON; Resp 18; Pulse Ox 90% ;                                                   hs1 

09:51  / 68 (auto/);                                                                    hs1 

09:51 Pulse 90 MON; Pulse Ox 90% ;                                                            hs1 

10:06  / 71 (auto/);                                                                    hs1 

10:06 Pulse 92 MON; Pulse Ox 90% ;                                                            hs1 

10:21  / 71 (auto/);                                                                    hs1 

10:22 Pulse 86 MON; Pulse Ox 92% ;                                                            hs1 

10:36 BP 96 / 66 (auto/);                                                                     hs1 

10:36 Pulse 84 MON; Pulse Ox 91% ;                                                            hs1 

10:41 Pulse 82 MON; Pulse Ox 90% ;                                                            hs1 

11:26  / 86 (auto/);                                                                    hs1 

11:41  / 81 (auto/);                                                                    hs1 

11:41 Pulse 82 MON; Pulse Ox 93% ;                                                            hs1 

11:56  / 84 (auto/);                                                                    hs1 

11:56 Pulse 76 MON; Resp 18; Pulse Ox 94% ;                                                   hs1 

12:09 Pulse 78 MON; Pulse Ox 93% ;                                                            hs1 

12:11  / 79 (auto/);                                                                    hs1 

12:25 Pulse 80 MON; Pulse Ox 93% ;                                                            hs1 

12:26  / 87 (auto/);                                                                    hs1 

12:41  / 87 (auto/);                                                                    hs1 

12:41 Pulse 84 MON; Pulse Ox 94% ;                                                            hs1 

12:56  / 86 (auto/);                                                                    hs1 

12:57 Pulse 116 MON; Pulse Ox 92% ;                                                           hs1 

13:10 Pulse 126 MON;                                                                          hs1 

13:11 Pulse 104 MON; Pulse Ox 91% ;                                                           hs1 

13:11  / 91 (auto/);                                                                    hs1 

13:12 Pulse 92 MON;                                                                           hs1 

13:28  / 81 (auto/);                                                                    hs1 

13:29 Pulse 100 MON; Resp 18; Pulse Ox 93% ; Pain 10/10;                                      hs1 

13:41  / 78 (auto/);                                                                    hs1 

13:42 Pulse 86 MON; Pulse Ox 90% ;                                                            hs1 

13:56  / 74 (auto/);                                                                    hs1 

13:57 Pulse 86 MON; Pulse Ox 89% ;                                                            hs1 

14:11  / 72 (auto/);                                                                    hs1 

14:12 Pulse 84 MON; Resp 18; Pulse Ox 91% ;                                                   hs1 

06:18 Body Mass Index 28.19 (81.65 kg, 170.18 cm)                                             [facility] 

                                                                                                  

Vitals:                                                                                           

06:00 Log In Time N/A - ambulance arrival.                                                    [facility]

                                                                                                  

ED Course:                                                                                        

05:50 Patient visited by Unique Roberts PCA.                                                tmm1

05:50 Patient moved to Waiting                                                                tmm1

05:51 No Pcp is Private Physician.                                                            tmm1

05:53 Eladia Rahman,RN is Primary Nurse.                                                 tmm1

05:53 Patient moved to 11                                                                     tmm1

05:57 Triage Initiated                                                                        kas2

06:04 Maintain field IV. Dressing intact. Site clean & dry. Gauge & site: 18G right forearm.  kas2

06:05 Patient visited by Ashley Jorgensen RN.                                                        [facility]

06:20 Pt greeted and oriented to ED. Patient advised of names of staff involved in care,      [facility] 

      location of call bell, wait times and NPO status. Accompanied by Significant Other,         

      Patient has correct armband on for positive identification. Placed in gown. Bed in low      

      position. Call light in reach. Side rails up X2. Cardiac monitor on. Pulse ox on. NIBP      

      on.                                                                                         

06:20 Basic Metabolic Profile Sent.                                                           tm5 

06:20 CBC with Diff Sent.                                                                     tm5 

06:20 Cardiac Injury Profile Sent.                                                            tm5 

06:20 D-Dimer Quant Sent.                                                                     tm5 

06:21 Patient visited by Ankur Snyder PCA.                                                     [facility] 

06:21 Troponin Sent.                                                                          tm5 

06:21 Labs drawn. (by ED staff). Sent per order to lab.                                       tm5 

06:21 EKG done. (by ED staff). Reviewed by Sree Bowden DO.                                v 

06:47 Patient visited by Milana Mcfadden RN.                                                     tm5 

06:48 Notified attending ED physician of Critical lab value. Dr Bowden aware of D-dimer      5 

      results.                                                                                    

07:07 Lorraine Santos DO is PHCP.                                                           jo4 

07:07 Lobito Alba MD is Attending Physician.                                               jo4 

07:17 Patient visited by Lois Hilton RN.                                                hs1 

07:23 Patient visited by Lorraine Santos DO.                                                jo4 

07:32 Chest, 2 View (pa\E\lat) Returned.                                                        EDMS

08:05 Patient moved to Ultrasound                                                             eg2 

08:08 Patient visited by Lobito Alba MD.                                                   br1 

08:22 Patient name changed from Gene\S\L\S\Raymo\S\ to Gene\S\L\S\Raymo Ii.                          
   EDMS

08:23 [facility] Payment Agreement was scanned into [employer] and attached to record.               lg  

08:42 Patient moved to 11                                                                     eg2 

08:43 Patient visited by Lois Hilton RN.                                                hs1 

09:00 US Lower Extremities Bilateral R/O DVT Returned.                                        EDMS

09:39 Patient visited by Lois Hilton RN.                                                hs1 

10:37 Patient visited by Lois Hilton RN.                                                hs1 

10:38 The patient / caregiver is instructed regarding the plan of care and ED course.         hs1 

11:30 Patient visited by Lois Hilton RN.                                                hs1 

11:59 Patient visited by Filipe Seay PCA.                                               jrd 

11:59 EKG done. (by ED staff). Reviewed by Lorraine Santos DO.                                jrd 

12:08 CARDIAC MARKER PANEL Sent.                                                              hs1 

12:09 Patient visited by Lois Hilton RN.                                                hs1 

12:34 Jed Hernandez is Hospitalizing Provider.                                                  br1 

13:38 Patient visited by Filipe Seay PCA.                                               jrd 

13:38 EKG done. (by ED staff). Reviewed by Lorraine Santos DO.                                raiza 

13:45 VQ Scan Returned.                                                                       EDMS

13:48 Patient moved to Admit Hold                                                             dy  

14:31 No procedures done that require assistance.                                             hs1 

15:11 T-Sheet-- Draft Copy was scanned into [employer] and attached to record.                   gb  

                                                                                             

12:12 ECG/EKG was scanned into [employer] and attached to record.                                gb  

12:12 Trend VS was scanned into [employer] and attached to record.                               gb  

12:13 PCR was scanned into [employer] and attached to record.                                    gb  

                                                                                                  

Administered Medications:                                                                         

                                                                                             

08:30 Drug: Aspirin 325 mg [aspirin 325 mg tablet (1 tabs)] Route: PO;                        hs1 

                                                                                                  

                                                                                                  

Attachments:                                                                                      

12:12 Trend VS                                                                                gb  

                                                                                                  

Order Results:                                                                                    

Lab Order: Basic Metabolic Profile; SPEC'M 17 06:20                                         

      Test: GLUCOSE, FASTING; Value: 92; Range: ; Units: MG/DL; Status: F                   

      Test: BLOOD UREA NITROGEN; Value: 3; Range: 7-18; Abnormal: Below low normal; Units:        

      MG/DL; Status: F                                                                            

      Test: CREATININE FOR GFR; Value: 0.52; Range: 0.70-1.30; Abnormal: Below low normal;        

      Units: MG/DL; Status: F                                                                     

      Test: GLOMERULAR FILTRATION RATE; Value: > 60.0; Range: >56; Status: F                      

      Test: SODIUM LEVEL; Value: 138; Range: 136-145; Units: MEQ/L; Status: F                     

      Test: POTASSIUM SERUM; Value: 3.6; Range: 3.5-5.1; Units: MEQ/L; Status: F                  

      Test: CHLORIDE LEVEL; Value: 101; Range: ; Units: MEQ/L; Status: F                    

      Test: CARBON DIOXIDE LEVEL; Value: 30; Range: 21-32; Units: MEQ/L; Status: F                

      Test: ANION GAP; Value: 7; Range: 8-16; Abnormal: Below low normal; Units: MEQ/L;           

      Status: F                                                                                   

      Test: CALCIUM LEVEL; Value: 7.9; Range: 8.5-10.1; Abnormal: Below low normal; Units:        

      MG/DL; Status: F                                                                            

      Test Note: &nbsp;; Units are mL/min/1.73 m2 Chronic Kidney Disease Staging per NKF:       

      Stage I & II GFR >=60 Normal to Mildly Decreased Stage III GFR 30-59 Moderately           

      Decreased Stage IV GFR 15-29 Severely Decreased Stage V GFR <15 Very Little GFR Left        

      ESRD GFR <15 on RRT                                                                         

Lab Order: CBC with Diff; SPEC'M 17 06:20                                                   

      Test: WHITE BLOOD COUNT; Value: 6.4; Range: 4.0-10.0; Units: K/mm3; Status: F               

      Test: RED BLOOD COUNT; Value: 3.54; Range: 4.30-6.10; Abnormal: Below low normal;           

      Units: M/mm3; Status: F                                                                     

      Test: HEMOGLOBIN; Value: 12.7; Range: 14.0-18.0; Abnormal: Below low normal; Units:         

      g/dl; Status: F                                                                             

      Test: HEMATOCRIT; Value: 37.9; Range: 42.0-52.0; Abnormal: Below low normal; Units: %;      

      Status: F                                                                                   

      Test: MEAN CORPUSCULAR VOLUME; Value: 107.2; Range: 80.0-96.0; Abnormal: Above high         

      normal; Units: fl; Status: F                                                                

      Test: MEAN CORPUSCULAR HEMOGLOBIN; Value: 36.0; Range: 27.0-33.0; Abnormal: Above high      

      normal; Units: pg; Status: F                                                                

      Test: MEAN CORPUSCULAR HGB CONC; Value: 33.6; Range: 32.0-36.5; Units: g/dl; Status: F      

      Test: RED CELL DISTRIBUTION WIDTH; Value: 14.4; Range: 11.5-14.5; Units: %; Status: F       

      Test: PLATELET COUNT, AUTOMATED; Value: 56; Range: 150-450; Abnormal: Below low normal;     

      Units: k/mm3; Status: F                                                                     

      Test: NEUTROPHILS %; Value: 53.6; Range: 36.0-66.0; Units: %; Status: F                     

      Test: LYMPH %; Value: 30.5; Range: 24.0-44.0; Units: %; Status: F                           

      Test: MONO %; Value: 9.2; Range: 0.0-5.0; Abnormal: Above high normal; Units: %;            

      Status: F                                                                                   

      Test: EOS %; Value: 2.6; Range: 0.0-3.0; Units: %; Status: F                                

      Test: BASO %; Value: 0.8; Range: 0.0-1.0; Units: %; Status: F                               

      Test: LARGE UNSTAINED CELL %; Value: 3.3; Range: 0.0-4.0; Units: %; Status: F               

      Test: NEUTROPHILS #; Value: 3.4; Range: 1.8-7.7; Units: K/mm3; Status: F                    

      Test: LYMPH #; Value: 1.9; Range: 1.5-4.5; Units: K/mm3; Status: F                          

      Test: MONO #; Value: 0.6; Range: 0.0-0.8; Units: K/mm3; Status: F                           

      Test: EOS #; Value: 0.2; Range: 0.0-0.50; Units: K/mm3; Status: F                           

      Test: BASO #; Value: 0.0; Range: 0.0-0.2; Units: K/mm3; Status: F                           

      Test: LARGE UNSTAINED CELL #; Value: 0.2; Range: 0.0-0.4; Units: K/mm3; Status: F           

Lab Order: Cardiac Injury Profile; SPEC'M 01/20/17 06:20                                          

      Test: CPK CREATINE PHOSPHOKINASE; Value: 134; Range: ; Units: U/L; Status: F          

      Test: CK-MB VALUE MASS; Value: 1.5; Range: 0.0-3.6; Units: NG/ML; Status: F                 

      Test: MB/CK RELATIVE INDEX; Value: 1.11; Range: < OR =4; Status: F                          

      Test Note: &nbsp;; DIAGNOSIS CRITERIA MMB ng/ml Relative Index (RI) NON-AMI < or = 5      

      N/A GRAY ZONE > 5 < or = 4 AMI > 5 > 4                                                      

Lab Order: D-Dimer Quant; SPEC'M 01/20/17 06:20                                                   

      Test: D-DIMER QUANT; Value: 2344.1; Range: <500; Abnormal: Above high normal; Units:        

      ng/ml; Status: F                                                                            

Lab Order: Troponin; SPEC'M 01/20/17 06:20                                                        

      Test: TROPONIN I; Value: < 0.02; Range: < 0.10; Units: NG/ML; Status: F                     

      Test Note: &nbsp;; Troponin I Reference Interval for Siemens Modlar LOCI: 99th             

      Percentile= 0.00-0.045 ng/ml Risk Stratification: <= 0.10 ng/ml Decreased Risk for          

      Adverse Clinical Events. 0.10-1.50 ng/ml Increased Risk for Adverse Clinical Events.        

      Evaluation of additional criterion and/or repeat testing in 2-6 hours is suggested to       

      rule out myocardial damage. >= 1.50 ng/ml Indicative of Myocardial Injury.                  

Lab Order: RBC MORPH PROF NO CHARGE; SPEC'M 01/20/17 06:20                                        

      Test: PLATELET ESTIMATE; Range: NORMAL; Status: I                                           

      Test: RBC MORPHOLOGY; Value: NORMAL; Status: F                                              

      Test: MACROCYTOSIS; Value: 2+; Status: F                                                    

      Test: PLATELET ESTIMATE; Value: DECREASED; Range: NORMAL; Status: F                         

Lab Order: CARDIAC MARKER PANEL; SPEC 17 12:05                                            

      Test: CPK CREATINE PHOSPHOKINASE; Value: 110; Range: ; Units: U/L; Status: F          

      Test: CK-MB VALUE MASS; Value: 1.0; Range: 0.0-3.6; Units: NG/ML; Status: F                 

      Test: MB/CK RELATIVE INDEX; Value: 0.90; Range: < OR =4; Status: F                          

      Test: TROPONIN I; Value: < 0.02; Range: < 0.10; Units: NG/ML; Status: F                     

      Test Note: &nbsp;; DIAGNOSIS CRITERIA MMB ng/ml Relative Index (RI) NON-AMI < or = 5      

      N/A GRAY ZONE > 5 < or = 4 AMI > 5 > 4                                                      

                                                                                                  

Radiology Order: Chest, 2 View (pa\E\lat)                                                         

      Test: Chest, 2 View (pa\E\lat)                                                              

      REASON FOR EXAMINATION: Chest Pain; Clinical: Chest pain.; ; Technique: PA and              

      lateral.; ; Findings:; Trace left basilar atelectasis suggested. No further                 

      consolidation, effusion, or; pneumothorax. Mediastinum and cardiac silhouette normal.       

      Skeletal structures; intact.; ; Impression:; Trace left basilar atelectasis.; ; ;           

      Signed by; Isaiah Le MD 2017 07:18 A;                                            

Radiology Order: VQ Scan                                                                          

      Test: VQ Scan                                                                               

      REASON FOR EXAMINATION: Shortness of Breath;Chest Pain; Ventilation perfusion lung scan     

      2017; ; Indication: Shortness of breath, chest pain; ; Comparison: PA and lateral     

      chest 2017; ; Technique: After inhalation of 1.0 mCi 99m technetium DTPA aerosol,     

      5.5 mCi IV; technetium-99m Tc MAA was injected intravenously.; ; Findings: Overall          

      there is more homogeneous nuclide uptake within the lung; perfusion images when             

      compared to the finding ventilatory images.; ; There are moderate matched defects           

      nearly involving the bilateral upper lobes.; There are no VQ mismatches. There is air       

      trapping with radionuclide within the; trachea and central bronchi.; ; Corresponding        

      chest radiograph also performed today demonstrated trace of left; basilar atelectasis       

      only.; ; Impression; 1. Probability of pulmonary embolus based on VQ scan is is low         

      indeterminate.; There are no VQ mismatches. There are extensive upper lobe matched          

      defects; contributed to by the air trapping, somewhat limiting the exam.; ; ; Signed        

      by; Ana Maria Oliveira MD 2017 12:51 P;                                                    

Radiology Order: US Lower Extremities Bilateral R/O DVT                                           

      Test: US Lower Extremities Bilateral R/O DVT                                                

      REASON FOR EXAMINATION: Deformity/Swelling; BILATERAL LOWER EXTREMITY DOPPLER VENOUS        

      ULTRASOUND:; ; Clinical history: Chest discomfort, lower extremity swelling. Evaluate       

      for DVT.; ; ; Comparison: None.; ; Technique: The deep venous system of the bilateral       

      lower extremities is; evaluated with gray scale imaging, compression ultrasound, color      

      imaging and; duplex Doppler interrogation. Examination from the groin through the           

      popliteal; fossa into the proximal calf.; ; Findings: There is full compressibility         

      from the common femoral vein in the; inguinal region through the popliteal vein on both     

      sides. Color imaging confirms; patency throughout the course of the deep venous system.     

      There is respiratory; variation and augmented flow at all levels. Incidentally noted is     

      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        

      above; the popliteal. This is an anatomic variation. In the left inguinal region is a;      

      1.8 x 1.4 x 0.5 cm lymph node consistent with a benign finding.; ; Impression:; 1. No       

      Doppler venous ultrasound evidence of DVT in the bilateral lower; extremities.; ; ;         

      Signed by; Rudy Lomas MD 2017 08:49 A;                                          

Outcome:                                                                                          

                                                                                             

12:34 Decision to Hospitalize by Provider.                                                    br1 

14:32 Admission hand-off: Other: Diane Allred RN taking ED holders see documentation in     [address] for further assessments at this time. .                                            

17:13 Patient left the ED.                                                                    dy  

                                                                                                  

Signatures:                                                                                       

Dispatcher [employer]                           EDMS                                                 

Serenity Durand, Reg                  Reg  gb                                                   

Damion Jessica, Reg                    Reg  lg                                                   

Virgil Bolton, RN                       RN   Catherine Calderon                                   [id number]                                                  

Lobito Alba MD MD   br1                                                  

Lois Hilton RN                    RN   [id number]                                                  

Unique Roberts, PCA                    PCA  [id number]                                                 

Filipe Seay, PCA                   PCA  d                                                  

Lorraine Santos,                     DO   joAshley Washington,RN                            RN   [id number]                                                 

Ankur Synder, PCA                         PCA  Milana Marrufo,RN                         RN   [id number]                                                  

                                                                                                  

**************************************************************************************************



*** Chart Complete ***
MTDD

## 2017-01-22 NOTE — EDDOCDS
Physician Documentation                                                                           

[facility]                                                                         

Name: Gene Angel Ii                                                                               

Age: 50 yrs                                                                                       

Sex: Male                                                                                         

: 1966                                                                                   

MRN: [id number]                                                                                     

Arrival Date: 2017                                                                          

Time: 05:49                                                                                       

Account#: [id number]                                                                              

Bed Admit Hold                                                                                    

Private MD: No Pcp                                                                                

Disposition:                                                                                      

                                                                                             

12:33 I have independently interviewed and examined the patient, and I agree with the         br1 

      investigation, diagnosis and treatment plan as documented by the Resident.                  

                                                                                                  

Disposition:                                                                                      

17 12:34 Hospitalization ordered by Jed Hernandez for Inpatient Admission. Preliminary        

diagnosis is Chest pain, unspecified.                                                           

- Bed requested for  PCU.                                                                        

- Status is Inpatient Admission.                                                              dy  

- Condition is Stable.                                                                            

- Problem is new.                                                                                 

- Symptoms are unchanged.                                                                         

                                                                                                  

                                                                                                  

                                                                                                  

Historical:                                                                                       

- Allergies: Penicillins; IV Dye;                                                                 

- Home Meds:                                                                                      

1. none                                                                                         

- PMHx: back surgery;                                                                             

- Social history: Smoking status: Patient uses tobacco products, heavy tobacco smoker.            

No barriers to communication noted, The patient speaks fluent English.                          

- : The pt / caregiver states he / she is not on anticoagulants. Note patient not on              

meds.                                                                                           

- Exposure Risk Screening:: None identified.                                                      

                                                                                                  

                                                                                                  

Vital Signs:                                                                                      

06:13  / 85 (auto/);                                                                    tm5 

06:15 Pulse 84 MON; Pulse Ox 93% on R/A;                                                      tm5 

06:18  / 85; Pulse 82; Resp 18; Temp 97.7(O); Pulse Ox 92% on R/A; Weight 81.65 kg /    jmv 

      180.01 lbs (R); Height 5 ft. 7 in. (170.18 cm); Pain 8/10;                                  

06:36  / 87 (auto/);                                                                    tm5 

06:37 Pulse 80 MON; Pulse Ox 92% on R/A;                                                      tm5 

07:51  / 73 (auto/);                                                                    hs1 

07:52 Pulse 84 MON; Resp 18; Pulse Ox 94% ;                                                   hs1 

08:06  / 80 (auto/);                                                                    hs1 

08:06 Pulse 86 MON; Pulse Ox 95% ;                                                            hs1 

08:21  / 77 (auto/);                                                                    hs1 

08:21 Pulse 86 MON; Pulse Ox 93% ;                                                            hs1 

08:36  / 81 (auto/);                                                                    hs1 

08:36 Pulse 88 MON; Pulse Ox 84% ;                                                            hs1 

08:51  / 70 (auto/);                                                                    hs1 

08:51 Pulse 80 MON; Pulse Ox 92% ;                                                            hs1 

09:06  / 78 (auto/);                                                                    hs1 

09:06 Pulse 86 MON; Pulse Ox 91% ;                                                            hs1 

09:21  / 65 (auto/);                                                                    hs1 

09:21 Pulse 88 MON; Pulse Ox 91% ;                                                            hs1 

09:36  / 67 (auto/);                                                                    hs1 

09:36 Pulse 90 MON; Resp 18; Pulse Ox 90% ;                                                   hs1 

09:51  / 68 (auto/);                                                                    hs1 

09:51 Pulse 90 MON; Pulse Ox 90% ;                                                            hs1 

10:06  / 71 (auto/);                                                                    hs1 

10:06 Pulse 92 MON; Pulse Ox 90% ;                                                            hs1 

10:21  / 71 (auto/);                                                                    hs1 

10:22 Pulse 86 MON; Pulse Ox 92% ;                                                            hs1 

10:36 BP 96 / 66 (auto/);                                                                     hs1 

10:36 Pulse 84 MON; Pulse Ox 91% ;                                                            hs1 

10:41 Pulse 82 MON; Pulse Ox 90% ;                                                            hs1 

11:26  / 86 (auto/);                                                                    hs1 

11:41  / 81 (auto/);                                                                    hs1 

11:41 Pulse 82 MON; Pulse Ox 93% ;                                                            hs1 

11:56  / 84 (auto/);                                                                    hs1 

11:56 Pulse 76 MON; Resp 18; Pulse Ox 94% ;                                                   hs1 

12:09 Pulse 78 MON; Pulse Ox 93% ;                                                            hs1 

12:11  / 79 (auto/);                                                                    hs1 

12:25 Pulse 80 MON; Pulse Ox 93% ;                                                            hs1 

12:26  / 87 (auto/);                                                                    hs1 

12:41  / 87 (auto/);                                                                    hs1 

12:41 Pulse 84 MON; Pulse Ox 94% ;                                                            hs1 

12:56  / 86 (auto/);                                                                    hs1 

12:57 Pulse 116 MON; Pulse Ox 92% ;                                                           hs1 

13:10 Pulse 126 MON;                                                                          hs1 

13:11 Pulse 104 MON; Pulse Ox 91% ;                                                           hs1 

13:11  / 91 (auto/);                                                                    hs1 

13:12 Pulse 92 MON;                                                                           hs1 

13:28  / 81 (auto/);                                                                    hs1 

13:29 Pulse 100 MON; Resp 18; Pulse Ox 93% ; Pain 10/10;                                      hs1 

13:41  / 78 (auto/);                                                                    hs1 

13:42 Pulse 86 MON; Pulse Ox 90% ;                                                            hs1 

13:56  / 74 (auto/);                                                                    hs1 

13:57 Pulse 86 MON; Pulse Ox 89% ;                                                            hs1 

14:11  / 72 (auto/);                                                                    hs1 

14:12 Pulse 84 MON; Resp 18; Pulse Ox 91% ;                                                   hs1 

06:18 Body Mass Index 28.19 (81.65 kg, 170.18 cm)                                             [facility] 

                                                                                                  

MDM:                                                                                              

05:59 ECG WITH READING ER PHYS+CARDIAG ordered.                                               EDMS

06:13 Cardiac Monitor/Pulse Ox/q 30 min VS ordered.                                           mm11

06:13 IV Saline Lock ordered.                                                                 mm11

06:13 Rhythm Strip to chart ordered.                                                          mm11

06:13 Undress patient appropriately for examination ordered.                                  mm11

06:14 Basic Metabolic Profile Ordered.                                                        EDMS

06:14 CBC with Diff Ordered.                                                                  EDMS

06:14 Cardiac Injury Profile Ordered.                                                         EDMS

06:14 D-Dimer Quant Ordered.                                                                  EDMS

06:14 Troponin Ordered.                                                                       EDMS

06:23 Chest, 2 View (pa\E\lat) Ordered.                                                         EDMS

07:07 RBC MORPH PROF NO CHARGE Ordered.                                                       EDMS

07:10 Basic Metabolic Profile Reviewed.                                                       jo4 

07:10 CBC with Diff Reviewed.                                                                 jo4 

07:10 D-Dimer Quant Reviewed.                                                                 jo4 

07:10 Cardiac Injury Profile Reviewed.                                                        jo4 

07:10 Troponin Reviewed.                                                                      jo4 

07:11 VQ Scan Ordered.                                                                        EDMS

07:58 Financial registration complete.                                                        lg  

08:01 Aspirin 325 mg PO once ordered.                                                         jo4 

08:01 US Lower Extremities Bilateral R/O DVT Ordered.                                         EDMS

08:03 Redraw CIP &Troponin (put time in details section) ordered.                             jo4

08:03 Repeat EKG (put time details section) ordered.                                          jo4 

08:08 Repeat EKG (put time details section) complete.                                         lbd 

08:08 Redraw CIP &Troponin (put time in details section) complete.                            lbd

08:11 ECG WITH READING ER PHYS ordered.                                                       EDMS

08:11 CARDIAC MARKER PANEL Ordered.                                                           EDMS

08:14 RBC MORPH PROF NO CHARGE Reviewed.                                                      jo4 

08:14 Chest, 2 View (pa\E\lat) Reviewed.                                                        jo4

08:14 CBC with Diff Reviewed.                                                                 jo4 

08:23 [facility] Payment Agreement was scanned into [employer] and attached to record.               lg  

09:04 US Lower Extremities Bilateral R/O DVT Reviewed.                                        jo4 

12:36 BED REQUEST+ADM ordered.                                                                EDMS

13:32 ECG WITH READING ER PHYS+CARDIAG ordered.                                               EDMS

13:36 Admission / Observation Status ordered.                                                 EDMS

13:36 ECHOCARD,DOPPLER/COLOR FLOW ordered.                                                    EDMS

13:36 ELECTROCARDIOGRAM ADULT ordered.                                                        EDMS

13:37 NO ADDED SALT DIET ordered.                                                             EDMS

13:38 TROPONIN Ordered.                                                                       EDMS

14:43 CT Chest without contrast Ordered.                                                      EDMS

15:11 T-Sheet-- Draft Copy was scanned into [employer] and attached to record.                   gb  

                                                                                             

12:12 ECG/EKG was scanned into [employer] and attached to record.                                gb  

12:12 Trend VS was scanned into [employer] and attached to record.                               gb  

12:13 PCR was scanned into [employer] and attached to record.                                    gb  

                                                                                                  

Administered Medications:                                                                         

                                                                                             

08:30 Drug: Aspirin 325 mg [aspirin 325 mg tablet (1 tabs)] Route: PO;                        hs1 

                                                                                                  

                                                                                                  

Signatures:                                                                                       

Dispatcher [employer]                           EDMS                                                 

Shelby Lim, Unit Clerk                 Unit lbd                                                  

Serenity Durand, Reg                  Reg  gb                                                   

Damion Jessica, Reg                    Reg  lg                                                   

Virgil Bolton, RN                       Sree Puga,                     DO   mm11                                                 

Lobito Alba MD MD   br1                                                  

Lorraine Santos DO                    DO   jo4                                                  

Ashley JorgensenRN                            RN   kas2                                                 

Suraj Driscoll, Lois Delarosa RN, sa, RN   hs1                                                  

                                                                                                  

The chart was reviewed and I authenticate all verbal orders and agree with the evaluation and 
treatment provided.Attachments:

08:23 [facility] Payment Agreement                                                                lg  

15:11 T-Sheet-- Draft Copy                                                                    gb  

                                                                                             

12:12 ECG/EKG                                                                                 gb  

                                                                                                  

**************************************************************************************************



*** Chart Complete ***
MTDD